# Patient Record
Sex: MALE | Race: WHITE | NOT HISPANIC OR LATINO | Employment: OTHER | URBAN - METROPOLITAN AREA
[De-identification: names, ages, dates, MRNs, and addresses within clinical notes are randomized per-mention and may not be internally consistent; named-entity substitution may affect disease eponyms.]

---

## 2021-05-30 ENCOUNTER — RECORDS - HEALTHEAST (OUTPATIENT)
Dept: ADMINISTRATIVE | Facility: CLINIC | Age: 71
End: 2021-05-30

## 2023-06-20 NOTE — H&P (VIEW-ONLY)
[facility]      Preoperative Consultation   Donald F Schoenberger   : 1950   Gender: male    Date of Encounter: 2023    Nursing Notes:   Sofie Barnes  2023 11:06 AM  Addendum  Donald F Schoenberger is a 72 y.o. male (1950) who presents for preop evaluation undergoing peritoneal dialysis catheter placement (23) & L4-5 bilateral medial facetectomy decompression for treatment of L4-5 spinal stenosis (23) .    Date of Surgery: 23 & 23  Surgical Specialty: Nephrology  Pedrito Hernández MD (23)  Orthopedic/Spine  Gabe Bahena MD - [facility], [facility] (23)      Hospital/Surgical Facility: [facility] (23)   [facility] (23)    Fax number: [phone] ([facility])  Surgery type: outpatient  Primary Physician: Dominique Dan MA................... 2023 10:52 AM             History of Present Illness   Patient who is 72 y.o. male with hypertension, renal artery stenosis, prostate cancer, chronic kidney disease stage V is undergoing peritoneal dialysis catheter placement on 2023 for chronic kidney disease stage V and L4-5 bilateral medial facetectomy decompression for treatment of L4-L5 spinal stenosis on 2023.     Patient saw nephrology last week who recommended he proceed with embedded catheter prior to back surgery.    Personal or family history of anesthesia complications: No  Personal or family history of bleeding or clotting disorder: No    Aspirin daily: No  NSAIDs: No    Denies any fever, chills, body aches, nasal congestion, chest pain, shortness of breath, heart palpitations, new leg swelling, painful urination, skin rashes, or open sores.     Plays pickle ball several times a week without any difficulties.       Review of Systems   A comprehensive review of systems was negative except for items noted in HPI.    Patient Active Problem List   Diagnosis Code      HTN (hypertension) I10     Hyperlipidemia E78.5     GISEL (renal artery stenosis) (HC) I70.1     Chronic renal failure, stage 5 (HC) N18.5     Renal atrophy N26.1     Prostate cancer (HC) C61     Current Outpatient Medications   Medication Sig     calcitriol (ROCALTROL) 0.25 mcg capsule Take one 3 days a week (begun due to inc. PTH  )     chlorthalidone (HYGROTON) 25 mg tablet      cholecalciferol (VITAMIN D-3) 2,000 unit capsule Take 1 capsule by mouth once daily.     fexofenadine (ALLEGRA) 180 mg tablet Take 180 mg by mouth.     gabapentin (NEURONTIN) 100 mg capsule      losartan (COZAAR) 25 mg tablet Take 1 tablet by mouth once daily. To preserve kidney function and for hypertension     medication order composer Take 4 Tablets by mouth once daily. Sodium Bicarbonate 650mg  Takes to per day,  For high potassium     metoprolol succinate (TOPROL XL) 100 mg Sustained-Release tablet Take 1 tablet by mouth once daily. For hypertension (begun 4-13)     No current facility-administered medications for this visit.     Medications have been reviewed by me and are current to the best of my knowledge and ability.     Allergies   Allergen Reactions     Aspirin Bleeding     Nosebleeds.     Lipitor [Atorvastatin] Myalgia     Viagra [Sildenafil] Other - Describe In Comment Field     Flushing blurred vision and coryza     Past Surgical History:   . Laterality Date     FINGER SURGERY  2007    Rt 5th     KNEE ARTHROSCOPY  1990    Rt     PROSTATECTOMY  2010     SKIN SURGERY  2013    [facility]     TONSILLECTOMY       Social History     Tobacco Use     Smoking status: Never     Smokeless tobacco: Never   Vaping Use     Vaping Use: Never used   Substance Use Topics     Alcohol use: No     Drug use: No     Family History   Problem Relation Age of Onset     Heart Disease Father         cad       PAST DIFFICULTY WITH ANESTHESIA: None     Physical Exam   /70 (Cuff Site: Right Arm, Position: Sitting, Cuff Size: Adult Large)    Pulse 76   Ht 1.829 m (6')   Wt 102.8 kg (226 lb 11.2 oz)   SpO2 97%   BMI 30.75 kg/m   Body mass index is 30.75 kg/m .  General Appearance: Pleasant, alert, appropriate appearance for age. No acute distress  OroPharynx Exam: Dental hygiene adequate. Normal buccal mucosa. Normal pharynx.  Neck Exam: Supple, no masses or nodes.  Chest/Respiratory Exam: Normal chest wall and respirations. Clear to auscultation.  Cardiovascular Exam: Regular rate and rhythm. S1, S2, no murmur, click, gallop, or rubs.  Lower extremities: No lower extremity edema noted  Skin: no open wounds or rashes.        Assessment / Plan     The Pre-Op Tool    Recommendations      Intermediate Risk Procedure    Risk of CV Complication (RCRI)  1%    Current Cardiac Status  Good exertional capacity ( > 4 mets )    Cardiac History  No history of coronary artery disease           Labs  HGB within last 30 days  Potassium within last 30 days  Creatinine within last 30 days  EKG  Baseline EKG within the last 12 months  CXR  Not indicated    Stress Testing  Not indicated    * Testing recommendations are intended to assist, but not direct, clinical decisions.           Type & Screen should be obtained by Anesthesia only if the risk of transfusion is > 5% for the procedure         Hold Losartan (Cozaar) the evening before and/or morning of the procedure.  Continue taking Metoprolol (Lopressor, Toprol); be sure to take the evening before and/or morning of the procedure.  Hold Chlorthalidone on the morning of procedure.  Take your other medications as usual prior to the procedure  Hold vitamins and/or supplements for 1 week prior to the procedure  Okay to take Acetaminophen (Tylenol) up until the procedure  Hold / avoid NSAIDs (e.g. ibuprofen, naproxen) prior to procedure: 2 days for ibuprofen (Advil) and 4 days for naproxen (Aleve).    * Medication recommendations are not intended to be exhaustive; they are limited to common medications that are  potentially dangerous if incorrectly managed          Labs  * Data supports elimination of  routine  laboratory testing in favor of focused,  indicated  testing based on medical co-morbidities. A 2009 study randomized 1061 patients undergoing ambulatory, non-cataract surgery to routine or to indicated testing. Perioperative adverse events were similar (Anesthesia & Analgesia 2009;108:467-75; Anesthesiol. Clin. 2016 Mar;34(1):43-58).  Stress Testing  * The current ACC/AHA guideline states that 'non-invasive stress testing is not useful for patients [with no clinical risk factors] undergoing noncardiac surgery' (JACC. 2014;64(21);e1-76.).  RAAS Antagonist  * Hypotension during anesthesia is associated with continuing renin-angiotensin system (RAAS) antagonist. While it is unclear if holding RAAS antagonists reduces postoperative complications, in most circumstances our experts recommend holding RAAS antagonist 24 hours prior to surgery. (Postgrad Med J 2011;87:472-81; Anest 2017;126:16-27; BMC Anesthesiol 2018;18:26.)     Session ID: [id number]  Endnotes and bibliography available upon request: [email or web address]    Labs: pending  ECG: yes normal sinus rhythm with first-degree block.  EKG change compared to previous EKG in 2010.      1. Preoperative examination  Patient is medically optimized for procedures pending labs.  Would consider nephrology consult if needed during back surgery.  - CREATININE; Future  - POTASSIUM; Future  - HEMOGLOBIN; Future  - EKG 12 LEAD; Future  - LA ECG ROUTINE ECG W/LEAST 12 LDS W/I&R  - CREATININE  - POTASSIUM  - HEMOGLOBIN    2. Chronic renal failure, stage 5 (HC)  See above     3. Spinal stenosis at L4-L5 level  See above     SILAS Roman ....................  6/20/2023   2:14 PM

## 2023-07-06 RX ORDER — LOSARTAN POTASSIUM 25 MG/1
25 TABLET ORAL DAILY
COMMUNITY

## 2023-07-06 RX ORDER — METOPROLOL SUCCINATE 100 MG/1
100 TABLET, EXTENDED RELEASE ORAL DAILY
COMMUNITY

## 2023-07-06 RX ORDER — FEXOFENADINE HCL 180 MG/1
180 TABLET ORAL PRN
COMMUNITY

## 2023-07-06 RX ORDER — SODIUM BICARBONATE 650 MG/1
1300 TABLET ORAL 2 TIMES DAILY
COMMUNITY

## 2023-07-06 RX ORDER — CHLORTHALIDONE 25 MG/1
25 TABLET ORAL DAILY
COMMUNITY
Start: 2023-02-27

## 2023-07-06 RX ORDER — GABAPENTIN 100 MG/1
100 CAPSULE ORAL AT BEDTIME
COMMUNITY
Start: 2023-05-03

## 2023-07-10 ENCOUNTER — ANESTHESIA EVENT (OUTPATIENT)
Dept: SURGERY | Facility: CLINIC | Age: 73
End: 2023-07-10
Payer: COMMERCIAL

## 2023-07-11 ENCOUNTER — HOSPITAL ENCOUNTER (OUTPATIENT)
Facility: CLINIC | Age: 73
Discharge: HOME | End: 2023-07-12
Attending: ORTHOPAEDIC SURGERY | Admitting: ORTHOPAEDIC SURGERY
Payer: COMMERCIAL

## 2023-07-11 ENCOUNTER — APPOINTMENT (OUTPATIENT)
Dept: PHYSICAL THERAPY | Facility: CLINIC | Age: 73
End: 2023-07-11
Attending: ORTHOPAEDIC SURGERY
Payer: COMMERCIAL

## 2023-07-11 ENCOUNTER — APPOINTMENT (OUTPATIENT)
Dept: RADIOLOGY | Facility: CLINIC | Age: 73
End: 2023-07-11
Attending: ORTHOPAEDIC SURGERY
Payer: COMMERCIAL

## 2023-07-11 ENCOUNTER — ANESTHESIA (OUTPATIENT)
Dept: SURGERY | Facility: CLINIC | Age: 73
End: 2023-07-11
Payer: COMMERCIAL

## 2023-07-11 DIAGNOSIS — M48.062 SPINAL STENOSIS OF LUMBAR REGION WITH NEUROGENIC CLAUDICATION: Primary | ICD-10-CM

## 2023-07-11 PROBLEM — M48.061 LUMBAR SPINAL STENOSIS: Status: ACTIVE | Noted: 2023-07-11

## 2023-07-11 PROCEDURE — 250N000005 HC OR RX SURGIFLO HEMOSTATIC MATRIX 10ML 199102S OPNP: Performed by: ORTHOPAEDIC SURGERY

## 2023-07-11 PROCEDURE — 250N000013 HC RX MED GY IP 250 OP 250 PS 637: Performed by: HOSPITALIST

## 2023-07-11 PROCEDURE — 272N000001 HC OR GENERAL SUPPLY STERILE: Performed by: ORTHOPAEDIC SURGERY

## 2023-07-11 PROCEDURE — 250N000013 HC RX MED GY IP 250 OP 250 PS 637: Performed by: ANESTHESIOLOGY

## 2023-07-11 PROCEDURE — 999N000141 HC STATISTIC PRE-PROCEDURE NURSING ASSESSMENT: Performed by: ORTHOPAEDIC SURGERY

## 2023-07-11 PROCEDURE — 250N000013 HC RX MED GY IP 250 OP 250 PS 637: Performed by: PHYSICIAN ASSISTANT

## 2023-07-11 PROCEDURE — 360N000084 HC SURGERY LEVEL 4 W/ FLUORO, PER MIN: Performed by: ORTHOPAEDIC SURGERY

## 2023-07-11 PROCEDURE — 250N000009 HC RX 250: Performed by: ORTHOPAEDIC SURGERY

## 2023-07-11 PROCEDURE — 250N000011 HC RX IP 250 OP 636: Mod: JZ | Performed by: ANESTHESIOLOGY

## 2023-07-11 PROCEDURE — 258N000003 HC RX IP 258 OP 636: Performed by: ANESTHESIOLOGY

## 2023-07-11 PROCEDURE — 710N000010 HC RECOVERY PHASE 1, LEVEL 2, PER MIN: Performed by: ORTHOPAEDIC SURGERY

## 2023-07-11 PROCEDURE — 250N000011 HC RX IP 250 OP 636: Performed by: PHYSICIAN ASSISTANT

## 2023-07-11 PROCEDURE — 370N000017 HC ANESTHESIA TECHNICAL FEE, PER MIN: Performed by: ORTHOPAEDIC SURGERY

## 2023-07-11 PROCEDURE — 250N000009 HC RX 250: Performed by: NURSE ANESTHETIST, CERTIFIED REGISTERED

## 2023-07-11 PROCEDURE — 250N000011 HC RX IP 250 OP 636: Performed by: NURSE ANESTHETIST, CERTIFIED REGISTERED

## 2023-07-11 PROCEDURE — 99204 OFFICE O/P NEW MOD 45 MIN: CPT | Performed by: HOSPITALIST

## 2023-07-11 PROCEDURE — 999N000182 XR SURGERY CARM FLUORO GREATER THAN 5 MIN: Mod: TC

## 2023-07-11 PROCEDURE — 250N000009 HC RX 250: Performed by: PHYSICIAN ASSISTANT

## 2023-07-11 PROCEDURE — 97162 PT EVAL MOD COMPLEX 30 MIN: CPT | Mod: GP

## 2023-07-11 PROCEDURE — 97116 GAIT TRAINING THERAPY: CPT | Mod: GP

## 2023-07-11 RX ORDER — NALOXONE HYDROCHLORIDE 0.4 MG/ML
0.4 INJECTION, SOLUTION INTRAMUSCULAR; INTRAVENOUS; SUBCUTANEOUS
Status: DISCONTINUED | OUTPATIENT
Start: 2023-07-11 | End: 2023-07-12 | Stop reason: HOSPADM

## 2023-07-11 RX ORDER — HYDROMORPHONE HCL IN WATER/PF 6 MG/30 ML
0.4 PATIENT CONTROLLED ANALGESIA SYRINGE INTRAVENOUS EVERY 5 MIN PRN
Status: DISCONTINUED | OUTPATIENT
Start: 2023-07-11 | End: 2023-07-11 | Stop reason: HOSPADM

## 2023-07-11 RX ORDER — CEFAZOLIN SODIUM/WATER 2 G/20 ML
2 SYRINGE (ML) INTRAVENOUS SEE ADMIN INSTRUCTIONS
Status: DISCONTINUED | OUTPATIENT
Start: 2023-07-11 | End: 2023-07-11 | Stop reason: HOSPADM

## 2023-07-11 RX ORDER — PROPOFOL 10 MG/ML
INJECTION, EMULSION INTRAVENOUS PRN
Status: DISCONTINUED | OUTPATIENT
Start: 2023-07-11 | End: 2023-07-11

## 2023-07-11 RX ORDER — EPHEDRINE SULFATE 50 MG/ML
INJECTION, SOLUTION INTRAMUSCULAR; INTRAVENOUS; SUBCUTANEOUS PRN
Status: DISCONTINUED | OUTPATIENT
Start: 2023-07-11 | End: 2023-07-11

## 2023-07-11 RX ORDER — METOPROLOL SUCCINATE 100 MG/1
100 TABLET, EXTENDED RELEASE ORAL DAILY
Status: DISCONTINUED | OUTPATIENT
Start: 2023-07-12 | End: 2023-07-12 | Stop reason: HOSPADM

## 2023-07-11 RX ORDER — ACETAMINOPHEN 325 MG/1
650 TABLET ORAL
Status: CANCELLED | OUTPATIENT
Start: 2023-07-11

## 2023-07-11 RX ORDER — LIDOCAINE 40 MG/G
CREAM TOPICAL
Status: DISCONTINUED | OUTPATIENT
Start: 2023-07-11 | End: 2023-07-12 | Stop reason: HOSPADM

## 2023-07-11 RX ORDER — CALCIUM CARBONATE 500 MG/1
2 TABLET, CHEWABLE ORAL 2 TIMES DAILY
COMMUNITY

## 2023-07-11 RX ORDER — ACETAMINOPHEN 325 MG/1
650 TABLET ORAL EVERY 4 HOURS PRN
Status: DISCONTINUED | OUTPATIENT
Start: 2023-07-14 | End: 2023-07-12 | Stop reason: HOSPADM

## 2023-07-11 RX ORDER — CALCITRIOL 0.25 UG/1
0.25 CAPSULE, LIQUID FILLED ORAL
Status: DISCONTINUED | OUTPATIENT
Start: 2023-07-13 | End: 2023-07-12 | Stop reason: HOSPADM

## 2023-07-11 RX ORDER — PROPOFOL 10 MG/ML
INJECTION, EMULSION INTRAVENOUS CONTINUOUS PRN
Status: DISCONTINUED | OUTPATIENT
Start: 2023-07-11 | End: 2023-07-11

## 2023-07-11 RX ORDER — BUPIVACAINE HYDROCHLORIDE AND EPINEPHRINE 2.5; 5 MG/ML; UG/ML
INJECTION, SOLUTION EPIDURAL; INFILTRATION; INTRACAUDAL; PERINEURAL
Status: DISCONTINUED
Start: 2023-07-11 | End: 2023-07-11 | Stop reason: HOSPADM

## 2023-07-11 RX ORDER — PROCHLORPERAZINE MALEATE 5 MG
5 TABLET ORAL EVERY 6 HOURS PRN
Status: DISCONTINUED | OUTPATIENT
Start: 2023-07-11 | End: 2023-07-12 | Stop reason: HOSPADM

## 2023-07-11 RX ORDER — AMOXICILLIN 250 MG
1-2 CAPSULE ORAL 2 TIMES DAILY PRN
Qty: 30 TABLET | Refills: 0 | Status: SHIPPED | OUTPATIENT
Start: 2023-07-11

## 2023-07-11 RX ORDER — ONDANSETRON 2 MG/ML
4 INJECTION INTRAMUSCULAR; INTRAVENOUS EVERY 30 MIN PRN
Status: DISCONTINUED | OUTPATIENT
Start: 2023-07-11 | End: 2023-07-11 | Stop reason: HOSPADM

## 2023-07-11 RX ORDER — OXYCODONE HYDROCHLORIDE 5 MG/1
5 TABLET ORAL
Status: CANCELLED | OUTPATIENT
Start: 2023-07-11

## 2023-07-11 RX ORDER — ACETAMINOPHEN 325 MG/1
650 TABLET ORAL EVERY 4 HOURS PRN
Qty: 50 TABLET | Refills: 0 | Status: SHIPPED | OUTPATIENT
Start: 2023-07-11

## 2023-07-11 RX ORDER — SODIUM CHLORIDE, SODIUM LACTATE, POTASSIUM CHLORIDE, CALCIUM CHLORIDE 600; 310; 30; 20 MG/100ML; MG/100ML; MG/100ML; MG/100ML
INJECTION, SOLUTION INTRAVENOUS CONTINUOUS
Status: DISCONTINUED | OUTPATIENT
Start: 2023-07-11 | End: 2023-07-11 | Stop reason: HOSPADM

## 2023-07-11 RX ORDER — CALCIUM CARBONATE 500 MG/1
1000 TABLET, CHEWABLE ORAL 2 TIMES DAILY WITH MEALS
Status: DISCONTINUED | OUTPATIENT
Start: 2023-07-11 | End: 2023-07-12 | Stop reason: HOSPADM

## 2023-07-11 RX ORDER — HYDRALAZINE HYDROCHLORIDE 20 MG/ML
5 INJECTION INTRAMUSCULAR; INTRAVENOUS EVERY 6 HOURS PRN
Status: DISCONTINUED | OUTPATIENT
Start: 2023-07-11 | End: 2023-07-12 | Stop reason: HOSPADM

## 2023-07-11 RX ORDER — OXYCODONE HYDROCHLORIDE 5 MG/1
10 TABLET ORAL EVERY 4 HOURS PRN
Status: DISCONTINUED | OUTPATIENT
Start: 2023-07-11 | End: 2023-07-12 | Stop reason: HOSPADM

## 2023-07-11 RX ORDER — METHOCARBAMOL 500 MG/1
500 TABLET, FILM COATED ORAL EVERY 6 HOURS PRN
Status: DISCONTINUED | OUTPATIENT
Start: 2023-07-11 | End: 2023-07-12 | Stop reason: HOSPADM

## 2023-07-11 RX ORDER — SODIUM BICARBONATE 650 MG/1
1300 TABLET ORAL 2 TIMES DAILY
Status: DISCONTINUED | OUTPATIENT
Start: 2023-07-11 | End: 2023-07-12 | Stop reason: HOSPADM

## 2023-07-11 RX ORDER — LIDOCAINE 40 MG/G
CREAM TOPICAL
Status: DISCONTINUED | OUTPATIENT
Start: 2023-07-11 | End: 2023-07-11 | Stop reason: HOSPADM

## 2023-07-11 RX ORDER — OXYCODONE HYDROCHLORIDE 5 MG/1
5 TABLET ORAL EVERY 4 HOURS PRN
Status: DISCONTINUED | OUTPATIENT
Start: 2023-07-11 | End: 2023-07-12 | Stop reason: HOSPADM

## 2023-07-11 RX ORDER — ACETAMINOPHEN 325 MG/1
975 TABLET ORAL EVERY 8 HOURS
Status: DISCONTINUED | OUTPATIENT
Start: 2023-07-11 | End: 2023-07-12 | Stop reason: HOSPADM

## 2023-07-11 RX ORDER — AMOXICILLIN 250 MG
1 CAPSULE ORAL 2 TIMES DAILY
Status: DISCONTINUED | OUTPATIENT
Start: 2023-07-11 | End: 2023-07-12 | Stop reason: HOSPADM

## 2023-07-11 RX ORDER — DIPHENHYDRAMINE HCL 12.5 MG/5ML
12.5 SOLUTION ORAL EVERY 6 HOURS PRN
Status: DISCONTINUED | OUTPATIENT
Start: 2023-07-11 | End: 2023-07-12 | Stop reason: HOSPADM

## 2023-07-11 RX ORDER — HYDROMORPHONE HCL IN WATER/PF 6 MG/30 ML
0.4 PATIENT CONTROLLED ANALGESIA SYRINGE INTRAVENOUS
Status: DISCONTINUED | OUTPATIENT
Start: 2023-07-11 | End: 2023-07-12 | Stop reason: HOSPADM

## 2023-07-11 RX ORDER — CALCITRIOL 0.25 UG/1
0.5 CAPSULE, LIQUID FILLED ORAL
COMMUNITY

## 2023-07-11 RX ORDER — ONDANSETRON 2 MG/ML
4 INJECTION INTRAMUSCULAR; INTRAVENOUS EVERY 6 HOURS PRN
Status: DISCONTINUED | OUTPATIENT
Start: 2023-07-11 | End: 2023-07-12 | Stop reason: HOSPADM

## 2023-07-11 RX ORDER — KETAMINE HYDROCHLORIDE 10 MG/ML
INJECTION INTRAMUSCULAR; INTRAVENOUS PRN
Status: DISCONTINUED | OUTPATIENT
Start: 2023-07-11 | End: 2023-07-11

## 2023-07-11 RX ORDER — HYDROMORPHONE HCL IN WATER/PF 6 MG/30 ML
0.2 PATIENT CONTROLLED ANALGESIA SYRINGE INTRAVENOUS
Status: DISCONTINUED | OUTPATIENT
Start: 2023-07-11 | End: 2023-07-12 | Stop reason: HOSPADM

## 2023-07-11 RX ORDER — GLYCOPYRROLATE 0.2 MG/ML
INJECTION, SOLUTION INTRAMUSCULAR; INTRAVENOUS PRN
Status: DISCONTINUED | OUTPATIENT
Start: 2023-07-11 | End: 2023-07-11

## 2023-07-11 RX ORDER — FEXOFENADINE HCL 180 MG/1
180 TABLET ORAL DAILY PRN
Status: DISCONTINUED | OUTPATIENT
Start: 2023-07-11 | End: 2023-07-12 | Stop reason: HOSPADM

## 2023-07-11 RX ORDER — NALOXONE HYDROCHLORIDE 0.4 MG/ML
0.2 INJECTION, SOLUTION INTRAMUSCULAR; INTRAVENOUS; SUBCUTANEOUS
Status: DISCONTINUED | OUTPATIENT
Start: 2023-07-11 | End: 2023-07-12 | Stop reason: HOSPADM

## 2023-07-11 RX ORDER — ONDANSETRON 4 MG/1
4 TABLET, ORALLY DISINTEGRATING ORAL
Status: CANCELLED | OUTPATIENT
Start: 2023-07-11

## 2023-07-11 RX ORDER — CALCITRIOL 0.25 UG/1
0.5 CAPSULE, LIQUID FILLED ORAL
Status: DISCONTINUED | OUTPATIENT
Start: 2023-07-11 | End: 2023-07-12 | Stop reason: HOSPADM

## 2023-07-11 RX ORDER — FENTANYL CITRATE 50 UG/ML
25 INJECTION, SOLUTION INTRAMUSCULAR; INTRAVENOUS EVERY 5 MIN PRN
Status: DISCONTINUED | OUTPATIENT
Start: 2023-07-11 | End: 2023-07-11 | Stop reason: HOSPADM

## 2023-07-11 RX ORDER — LOSARTAN POTASSIUM 25 MG/1
25 TABLET ORAL DAILY
Status: DISCONTINUED | OUTPATIENT
Start: 2023-07-12 | End: 2023-07-12 | Stop reason: HOSPADM

## 2023-07-11 RX ORDER — METHOCARBAMOL 750 MG/1
750 TABLET, FILM COATED ORAL
Status: COMPLETED | OUTPATIENT
Start: 2023-07-11 | End: 2023-07-11

## 2023-07-11 RX ORDER — BISACODYL 10 MG
10 SUPPOSITORY, RECTAL RECTAL DAILY PRN
Status: DISCONTINUED | OUTPATIENT
Start: 2023-07-11 | End: 2023-07-12 | Stop reason: HOSPADM

## 2023-07-11 RX ORDER — BUPIVACAINE HYDROCHLORIDE AND EPINEPHRINE 2.5; 5 MG/ML; UG/ML
2 INJECTION, SOLUTION EPIDURAL; INFILTRATION; INTRACAUDAL; PERINEURAL ONCE
Status: COMPLETED | OUTPATIENT
Start: 2023-07-11 | End: 2023-07-11

## 2023-07-11 RX ORDER — VITAMIN B COMPLEX
1 TABLET ORAL EVERY EVENING
COMMUNITY

## 2023-07-11 RX ORDER — HYDROMORPHONE HCL IN WATER/PF 6 MG/30 ML
0.2 PATIENT CONTROLLED ANALGESIA SYRINGE INTRAVENOUS EVERY 5 MIN PRN
Status: DISCONTINUED | OUTPATIENT
Start: 2023-07-11 | End: 2023-07-11 | Stop reason: HOSPADM

## 2023-07-11 RX ORDER — OXYCODONE HYDROCHLORIDE 5 MG/1
5-10 TABLET ORAL EVERY 4 HOURS PRN
Qty: 20 TABLET | Refills: 0 | Status: SHIPPED | OUTPATIENT
Start: 2023-07-11

## 2023-07-11 RX ORDER — METHOCARBAMOL 750 MG/1
750 TABLET, FILM COATED ORAL
Status: CANCELLED | OUTPATIENT
Start: 2023-07-11

## 2023-07-11 RX ORDER — FENTANYL CITRATE 50 UG/ML
50 INJECTION, SOLUTION INTRAMUSCULAR; INTRAVENOUS EVERY 5 MIN PRN
Status: DISCONTINUED | OUTPATIENT
Start: 2023-07-11 | End: 2023-07-11 | Stop reason: HOSPADM

## 2023-07-11 RX ORDER — ACETAMINOPHEN 500 MG
1000 TABLET ORAL EVERY 8 HOURS PRN
Status: ON HOLD | COMMUNITY
End: 2023-07-12

## 2023-07-11 RX ORDER — CHLORTHALIDONE 25 MG/1
25 TABLET ORAL DAILY
Status: DISCONTINUED | OUTPATIENT
Start: 2023-07-12 | End: 2023-07-12 | Stop reason: HOSPADM

## 2023-07-11 RX ORDER — FENTANYL CITRATE 50 UG/ML
INJECTION, SOLUTION INTRAMUSCULAR; INTRAVENOUS PRN
Status: DISCONTINUED | OUTPATIENT
Start: 2023-07-11 | End: 2023-07-11

## 2023-07-11 RX ORDER — CEFAZOLIN SODIUM/WATER 2 G/20 ML
2 SYRINGE (ML) INTRAVENOUS
Status: COMPLETED | OUTPATIENT
Start: 2023-07-11 | End: 2023-07-11

## 2023-07-11 RX ORDER — SODIUM CHLORIDE 9 MG/ML
INJECTION, SOLUTION INTRAVENOUS CONTINUOUS
Status: DISCONTINUED | OUTPATIENT
Start: 2023-07-11 | End: 2023-07-11 | Stop reason: HOSPADM

## 2023-07-11 RX ORDER — VITAMIN B COMPLEX
25 TABLET ORAL EVERY EVENING
Status: DISCONTINUED | OUTPATIENT
Start: 2023-07-11 | End: 2023-07-12 | Stop reason: HOSPADM

## 2023-07-11 RX ORDER — POLYETHYLENE GLYCOL 3350 17 G/17G
17 POWDER, FOR SOLUTION ORAL DAILY
Status: DISCONTINUED | OUTPATIENT
Start: 2023-07-12 | End: 2023-07-12 | Stop reason: HOSPADM

## 2023-07-11 RX ORDER — LIDOCAINE HYDROCHLORIDE 10 MG/ML
INJECTION, SOLUTION EPIDURAL; INFILTRATION; INTRACAUDAL; PERINEURAL PRN
Status: DISCONTINUED | OUTPATIENT
Start: 2023-07-11 | End: 2023-07-11

## 2023-07-11 RX ORDER — ACETAMINOPHEN 325 MG/1
975 TABLET ORAL ONCE
Status: COMPLETED | OUTPATIENT
Start: 2023-07-11 | End: 2023-07-11

## 2023-07-11 RX ORDER — CALCITRIOL 0.25 UG/1
0.25 CAPSULE, LIQUID FILLED ORAL
COMMUNITY

## 2023-07-11 RX ORDER — ONDANSETRON 4 MG/1
4 TABLET, ORALLY DISINTEGRATING ORAL EVERY 30 MIN PRN
Status: DISCONTINUED | OUTPATIENT
Start: 2023-07-11 | End: 2023-07-11 | Stop reason: HOSPADM

## 2023-07-11 RX ORDER — ONDANSETRON 4 MG/1
4 TABLET, ORALLY DISINTEGRATING ORAL EVERY 6 HOURS PRN
Status: DISCONTINUED | OUTPATIENT
Start: 2023-07-11 | End: 2023-07-12 | Stop reason: HOSPADM

## 2023-07-11 RX ORDER — MAGNESIUM SULFATE 4 G/50ML
4 INJECTION INTRAVENOUS ONCE
Status: COMPLETED | OUTPATIENT
Start: 2023-07-11 | End: 2023-07-11

## 2023-07-11 RX ADMIN — ACETAMINOPHEN 975 MG: 325 TABLET ORAL at 21:50

## 2023-07-11 RX ADMIN — FENTANYL CITRATE 100 MCG: 50 INJECTION, SOLUTION INTRAMUSCULAR; INTRAVENOUS at 07:34

## 2023-07-11 RX ADMIN — OXYCODONE HYDROCHLORIDE 5 MG: 5 TABLET ORAL at 21:50

## 2023-07-11 RX ADMIN — Medication 10 MG: at 08:02

## 2023-07-11 RX ADMIN — Medication 2 G: at 07:26

## 2023-07-11 RX ADMIN — SUGAMMADEX 200 MG: 100 INJECTION, SOLUTION INTRAVENOUS at 08:56

## 2023-07-11 RX ADMIN — CALCITRIOL 0.5 MCG: 0.25 CAPSULE ORAL at 21:50

## 2023-07-11 RX ADMIN — PROPOFOL 150 MG: 10 INJECTION, EMULSION INTRAVENOUS at 07:34

## 2023-07-11 RX ADMIN — METHOCARBAMOL TABLETS 500 MG: 500 TABLET, COATED ORAL at 17:19

## 2023-07-11 RX ADMIN — CALCIUM CARBONATE (ANTACID) CHEW TAB 500 MG 1000 MG: 500 CHEW TAB at 17:19

## 2023-07-11 RX ADMIN — ACETAMINOPHEN 975 MG: 325 TABLET ORAL at 06:59

## 2023-07-11 RX ADMIN — Medication 25 MCG: at 21:49

## 2023-07-11 RX ADMIN — ROCURONIUM BROMIDE 20 MG: 10 INJECTION, SOLUTION INTRAVENOUS at 08:13

## 2023-07-11 RX ADMIN — SENNOSIDES AND DOCUSATE SODIUM 1 TABLET: 50; 8.6 TABLET ORAL at 21:49

## 2023-07-11 RX ADMIN — SODIUM CHLORIDE: 9 INJECTION, SOLUTION INTRAVENOUS at 07:01

## 2023-07-11 RX ADMIN — MAGNESIUM SULFATE HEPTAHYDRATE 4 G: 4 INJECTION, SOLUTION INTRAVENOUS at 07:11

## 2023-07-11 RX ADMIN — GLYCOPYRROLATE 0.2 MG: 0.2 INJECTION INTRAMUSCULAR; INTRAVENOUS at 07:26

## 2023-07-11 RX ADMIN — PROPOFOL 150 MCG/KG/MIN: 10 INJECTION, EMULSION INTRAVENOUS at 07:34

## 2023-07-11 RX ADMIN — LIDOCAINE HYDROCHLORIDE 50 MG: 10 INJECTION, SOLUTION EPIDURAL; INFILTRATION; INTRACAUDAL; PERINEURAL at 07:34

## 2023-07-11 RX ADMIN — METHOCARBAMOL 750 MG: 750 TABLET, FILM COATED ORAL at 07:11

## 2023-07-11 RX ADMIN — ACETAMINOPHEN 975 MG: 325 TABLET ORAL at 14:48

## 2023-07-11 RX ADMIN — Medication 10 MG: at 08:15

## 2023-07-11 RX ADMIN — MIDAZOLAM 2 MG: 1 INJECTION INTRAMUSCULAR; INTRAVENOUS at 07:26

## 2023-07-11 RX ADMIN — HYDROMORPHONE HYDROCHLORIDE 0.5 MG: 1 INJECTION, SOLUTION INTRAMUSCULAR; INTRAVENOUS; SUBCUTANEOUS at 08:26

## 2023-07-11 RX ADMIN — ROCURONIUM BROMIDE 50 MG: 10 INJECTION, SOLUTION INTRAVENOUS at 07:34

## 2023-07-11 RX ADMIN — KETAMINE HYDROCHLORIDE 20 MG: 10 INJECTION, SOLUTION INTRAMUSCULAR; INTRAVENOUS at 07:34

## 2023-07-11 ASSESSMENT — ACTIVITIES OF DAILY LIVING (ADL)
ADLS_ACUITY_SCORE: 36
ADLS_ACUITY_SCORE: 36
ADLS_ACUITY_SCORE: 35
ADLS_ACUITY_SCORE: 36
ADLS_ACUITY_SCORE: 36

## 2023-07-11 NOTE — CONSULTS
MEDICINE CONSULT    Physician requesting consult: Dr. Bahena  Reason for consult: Medical management     Assessment and Plan:    Donald F Schoenberger is a 72 year old old male who  has a past medical history of Hypertension and Renal disease.     7/11/2023 - Admitted for elective lumbar facetectomy and decompression with Dr. Bahena. HMS is consult    Essential HTN  Renal artery stenosis  No headache or lightheadedness  -resume chlorthalidone, metoprolol tomorrow    CKD 5  Renal atrophy left  Baseline Cr 4.5  Healing PD cath site left abd  -resume calcitriol, Nabicarb  -losartan tomorrow  -check am bmp    Prostate Cancer s/p prostatectomy  Post-op void monitoring per primary  Ca undetectable for the past 13 years    Chronic anemia  Baseline 10.3    1st degree AV block  RRR on exam, no murmur    Status-post Procedure(s):  LUMBAR 4-5 BILATERAL MEDIAL FACETECTOMY AND DECOMPRESSIONperformed on 07/11/23    EBL: 5cc  No results found for: HGB, CR    PPx: defer to surgery, will comment if medicine input requested     History:    Donald F Schoenberger is a 72 year old old male who is feeling good post-op. He's taking PO, voiding. Not yet passing gas. No acute complaints other than dry throat/mouth.    Denies history of MI, CVA, VTE, DM   Former law enforcement    Denies heavy alcohol use  Denies tobacco use    Surgical History  He  has no past surgical history on file.   History reviewed. No pertinent surgical history.    Allergies  Allergies   Allergen Reactions     Aspirin Other (See Comments)     Nosebleeds.     Atorvastatin Muscle Pain (Myalgia)     Sildenafil Other (See Comments)     Flushing blurred vision and coryza       Social History  Reviewed, and he  reports that he has never smoked. He has never used smokeless tobacco. He reports that he does not currently use alcohol. He reports that he does not use drugs.  Social History     Tobacco Use     Smoking status: Never     Smokeless tobacco: Never   Substance Use  Topics     Alcohol use: Not Currently       Family History  Reviewed, and family history is not on file.    Review of Systems  All pertinent positives and negatives reviewed in History.  All other 12 point review of systems reviewed and negative.      Objective:    Physical Exam  Constitutional: Appears nad in the bed, Body mass index is 29.69 kg/m .   Oral mucosa dry, pupils symmetric  abd binder in place  Moving all 4 extremities  Psych: Normal mood and affect, alert and oriented ×3    Cardiographics  ECG: personally reviewed     Imaging  XR Surgery TATI  Fluoro G/T 5 Min  This exam was marked as non-reportable because it will not be read by a   radiologist or a [place] non-radiologist provider.     Lab Review   No visits with results within 2 Month(s) from this visit.   Latest known visit with results is:   No results found for any previous visit.       Appreciate the opportunity to participate in the care of Donald F Schoenberger, please feel free to contact for any questions or concerns     Ángel Funez MD, MPH  Hospitalist  [facility]  Office # [phone]

## 2023-07-11 NOTE — PROCEDURES
Procedure     PREOPERATIVE DIAGNOSES:  1.     L4-5 spinal stenosis with neurogenic claudication.  2.     Failure of conservative management.      POSTOPERATIVE DIAGNOSES:  1.     L4-5 spinal stenosis with neurogenic claudication.  2.     Failure of conservative management.      PROCEDURES PERFORMED:  1.     Right L4-5 laminotomy.  2.     Bilateral L4-5 medial facetectomies and bilateral lateral recess decompression.      ATTENDING SURGEON:  Gabe Bahena M.D.      FIRST ASSISTANT:  Baltazar Oneill PA-C assist required for the entire duration of the case, including patient positioning, soft tissue retraction, and patient safety. He was invaluable in the performance of the discectomy, particularly in protecting the edge of the dura and the nerve root. I would not have allowed this job to be done by a surgical tech, but by a trained surgical PA with training in spine.         ESTIMATED BLOOD LOSS:5 cc.     COMPLICATIONS:  None.     DETAILS OF PROCEDURE/INTRODUCTION:  The patient is a very pleasant 72 year old male patient who came to the clinic with a longstanding history of low back pain and bilateral lower extremity pain consistent with imaging studies showing severe L4-5 spinal stenosis. The patient had exhausted nonoperative measures and continued to have debilitating symptoms. Therefore, I had a discussion with him regarding surgery and specifically discussed the risks including but not limited to death, infection, dural tear, nerve injury, and nonresolution of symptoms, among others, and he accepted and wished to proceed.      The patient was brought to the operating room and placed under general endotracheal anesthetic without complications. Antibiotics were given intravenously within 1 hour of incision. He was then placed prone on the Daquan table ensuring that all nerve areas and bony areas were padded and free of pressure. The low back area was then prepped and draped in routine sterile manner. After  appropriate timeout, I placed a marking needle into the skin and subcutaneous tissue and took intraoperative imaging to plan my incision. Thereafter, I made an incision over the L4 and L5  spinous processes and carried out a subperiosteal dissection on the right side to expose the interlaminar space. I took intraoperative imaging to confirm my level. I then,  on the L4-5 level on the right side, used a andrews to andrews down the medial descending facet of L4. I used a Kerrison rongeur to perform a laminotomy and medial facetectomy all the way to the medial border of the L4 and L5 pedicles on the right side. I then, through the same laminotomy, performed a contralateral lateral recess decompression and medial facetectomy all the way to the medial border of the L4 and L5 pedicles on the left side.  I then performed copious irrigation and hemostasis. At the end of the procedure, there was a capacious canal at the L4-5 level with no evidence of neural impingement. I performed closure of the fascia utilizing Vicryl 0 in a continuous fashion over a drain. I then closed the subcutaneous tissue layer utilizing Vicryl 2-0. Vicryl 3-0 was used for the skin. Marcaine 0.25% with epinephrine was then injected into the skin and subcutaneous tissues. Steri-Strips were applied followed by a sterile dressing. The patient was then placed supine on his bed and subsequently extubated without complications and brought to the recovery room in satisfactory condition.      POSTOPERATIVE PLAN:  The patient is to mobilize as tolerated. Oxycodone for pain. After discharge, the patient will be seen back in clinic in 6 weeks.

## 2023-07-11 NOTE — ANESTHESIA CARE TRANSFER NOTE
Patient: Donald F Schoenberger    Procedure: Procedure(s):  LUMBAR 4-5 BILATERAL MEDIAL FACETECTOMY AND DECOMPRESSION       Diagnosis: Spinal stenosis [M48.00]  Diagnosis Additional Information: No value filed.    Anesthesia Type:   General     Note:    Oropharynx: oropharynx clear of all foreign objects and spontaneously breathing  Level of Consciousness: drowsy  Oxygen Supplementation: face mask  Level of Supplemental Oxygen (L/min / FiO2): 8  Independent Airway: airway patency satisfactory and stable  Dentition: dentition unchanged  Vital Signs Stable: post-procedure vital signs reviewed and stable  Report to RN Given: handoff report given  Patient transferred to: PACU    Handoff Report: Identifed the Patient, Identified the Reponsible Provider, Reviewed the pertinent medical history, Discussed the surgical course, Reviewed Intra-OP anesthesia mangement and issues during anesthesia, Set expectations for post-procedure period and Allowed opportunity for questions and acknowledgement of understanding      Vitals:  Vitals Value Taken Time   /69 07/11/23 0907   Temp 36.9  C (98.5  F) 07/11/23 0905   Pulse 62 07/11/23 0908   Resp 24 07/11/23 0908   SpO2 100 % 07/11/23 0908   Vitals shown include unvalidated device data.    Electronically Signed By: SERGIO Rocha CRNA  July 11, 2023  9:10 AM

## 2023-07-11 NOTE — PROVIDER NOTIFICATION
Notified Dr. Jones of pt not having labs this AM (pt has CKD4), per MDA, ok with H&P labs unless surgeon wants them. IV fluids changed to Normal saline @ rate of 100ml/hr    Dr Bahena also notified and no new lab orders placed.

## 2023-07-11 NOTE — PROGRESS NOTES
[facility] Orthopedic Brief Operative Note    Pre-operative diagnosis: Spinal stenosis [M48.00]   Post-operative diagnosis: Same   Procedure: Bilateral L4-5 medial facetectomy and decompression   Surgeon: Gabe Bahena   Assistant(s): Baltazar Oneill PA-C   Anesthesia: General endotracheal anesthesia   Estimated blood loss: Less than 20 ml   Total IV fluids: (See anesthesia record)   Blood transfusion: No transfusion was given during surgery   Total urine output: (See anesthesia record)   Drains: Hemovac   Specimens: None   Implants: None   Findings: L4-5 spinal stenosis   Complications: None   Condition: Stable   Weight bearing status: Weight bearing as tolerated   Activity: Activity as tolerated  Patient may move about with assist as indicated or with supervision   Orthotic management: Not applicable   Comments: See dictated operative report for full details     Patient admitted overnight for observation. Will anticipate going home tomorrow. HV drain may be removed tomorrow if HV drain <30 ml for 2 consecutive shifts. If HV output greater than 30 ml after 2 readings, update surgical team to see if safe to pull prior to discharge.     Shahzad Oneill PA-C  [facility]

## 2023-07-11 NOTE — ANESTHESIA POSTPROCEDURE EVALUATION
Patient: Donald F Schoenberger    Procedure: Procedure(s):  LUMBAR 4-5 BILATERAL MEDIAL FACETECTOMY AND DECOMPRESSION       Anesthesia Type:  General    Note:  Disposition: Admission   Postop Pain Control: Uneventful            Sign Out: Well controlled pain   PONV: No   Neuro/Psych: Uneventful            Sign Out: Acceptable/Baseline neuro status   Airway/Respiratory: Uneventful            Sign Out: Acceptable/Baseline resp. status   CV/Hemodynamics: Uneventful            Sign Out: Acceptable CV status; No obvious hypovolemia; No obvious fluid overload   Other NRE: NONE   DID A NON-ROUTINE EVENT OCCUR? No           Last vitals:  Vitals Value Taken Time   /69 07/11/23 1031   Temp 37  C (98.6  F) 07/11/23 0945   Pulse 61 07/11/23 1052   Resp 13 07/11/23 0948   SpO2 95 % 07/11/23 1052   Vitals shown include unvalidated device data.    Electronically Signed By: Sriram Jones MD  July 11, 2023  10:53 AM

## 2023-07-11 NOTE — PHARMACY-ADMISSION MEDICATION HISTORY
Pharmacist Admission Medication History    Admission medication history is complete. The information provided in this note is only as accurate as the sources available at the time of the update.    Medication reconciliation/reorder completed by provider prior to medication history? Yes    Information Source(s): Patient and CareEverywhere/SureScripts via in-person    Pertinent Information:     Medication Affordability:  Not including over the counter (OTC) medications, was there a time in the past 3 months when you did not take your medications as prescribed because of cost?: No    Allergies reviewed with patient and updates made in EHR: yes    Medication History Completed By: Whit Zimmerman, Juan LEYVA, BCPS,, BCCCP 7/11/2023 7:21 AM    Prior to Admission medications    Medication Sig Last Dose Taking? Auth Provider Long Term End Date   acetaminophen (TYLENOL) 500 MG tablet Take 1,000 mg by mouth every 8 hours as needed for mild pain 7/11/2023 at 0000 Yes Unknown, Entered By History     calcitRIOL (ROCALTROL) 0.25 MCG capsule Take 0.5 mcg by mouth three times a week Mon, Tues, Wed 7/10/2023 at pm Yes Unknown, Entered By History Yes    calcitRIOL (ROCALTROL) 0.25 MCG capsule Take 0.25 mcg by mouth four times a week Thurs, Fri, Sat, Sun 7/9/2023 at unknown Yes Unknown, Entered By History Yes    calcium carbonate (TUMS) 500 MG chewable tablet Take 2 chew tab by mouth 2 times daily With lunch and evening meal 7/10/2023 at pm Yes Unknown, Entered By History     chlorthalidone (HYGROTON) 25 MG tablet Take 25 mg by mouth daily 7/10/2023 at am Yes Reported, Patient Yes    fexofenadine (ALLEGRA) 180 MG tablet Take 180 mg by mouth as needed More than a month at unknown Yes Reported, Patient     gabapentin (NEURONTIN) 100 MG capsule Take 100 mg by mouth At Bedtime 7/10/2023 at pm Yes Reported, Patient Yes    losartan (COZAAR) 25 MG tablet Take 25 mg by mouth daily Every morning 7/11/2023 at 0500 Yes Reported, Patient Yes     metoprolol succinate ER (TOPROL XL) 100 MG 24 hr tablet Take 100 mg by mouth daily Every morning 7/11/2023 at 0500 Yes Reported, Patient Yes    sodium bicarbonate 650 MG tablet Take 1,300 mg by mouth 2 times daily 7/10/2023 at pm Yes Reported, Patient     sodium chloride (OCEAN) 0.65 % nasal spray Spray 1 spray into both nostrils daily as needed for congestion 7/11/2023 at 0000 Yes Unknown, Entered By History     Vitamin D3 (CHOLECALCIFEROL) 25 mcg (1000 units) tablet Take 1 tablet by mouth every evening 7/10/2023 at pm Yes Unknown, Entered By History

## 2023-07-11 NOTE — ANESTHESIA PREPROCEDURE EVALUATION
Anesthesia Pre-Procedure Evaluation    Patient: Donald F Schoenberger   MRN: [id number] : 1950        Procedure : Procedure(s):  LUMBAR 4-5 BILATERAL MEDIAL FACETECTOMY AND DECOMPRESSION          Past Medical History:   Diagnosis Date     Hypertension      Renal disease       History reviewed. No pertinent surgical history.   Allergies   Allergen Reactions     Aspirin Other (See Comments)     Nosebleeds.     Atorvastatin Muscle Pain (Myalgia)     Sildenafil Other (See Comments)     Flushing blurred vision and coryza      Social History     Tobacco Use     Smoking status: Never     Smokeless tobacco: Never   Substance Use Topics     Alcohol use: Not Currently      Wt Readings from Last 1 Encounters:   23 102.1 kg (225 lb)        Anesthesia Evaluation   Pt has had prior anesthetic.         ROS/MED HX  ENT/Pulmonary:  - neg pulmonary ROS     Neurologic:     (+) peripheral neuropathy,     Cardiovascular:     (+) hypertension-----    METS/Exercise Tolerance:     Hematologic:     (+) anemia,     Musculoskeletal:       GI/Hepatic:       Renal/Genitourinary:     (+) renal disease, type: CRI, Pt does not require dialysis,     Endo:     (+) Obesity,     Psychiatric/Substance Use:  - neg psychiatric ROS     Infectious Disease:  - neg infectious disease ROS     Malignancy:       Other:            Physical Exam    Airway  airway exam normal      Mallampati: II   TM distance: > 3 FB   Neck ROM: full   Mouth opening: > 3 cm    Respiratory Devices and Support         Dental       (+) Modest Abnormalities - crowns, retainers, 1 or 2 missing teeth      Cardiovascular   cardiovascular exam normal       Rhythm and rate: regular and normal     Pulmonary   pulmonary exam normal        breath sounds clear to auscultation           OUTSIDE LABS:  CBC: No results found for: WBC, HGB, HCT, PLT  BMP: No results found for: NA, POTASSIUM, CHLORIDE, CO2, BUN, CR, GLC  COAGS: No results found for: PTT, INR, FIBR  POC: No results  found for: BGM, HCG, HCGS  HEPATIC: No results found for: ALBUMIN, PROTTOTAL, ALT, AST, GGT, ALKPHOS, BILITOTAL, BILIDIRECT, TK  OTHER: No results found for: PH, LACT, A1C, LAKSHMI, PHOS, MAG, LIPASE, AMYLASE, TSH, T4, T3, CRP, SED    Anesthesia Plan    ASA Status:  3   NPO Status:  NPO Appropriate    Anesthesia Type: General.     - Airway: ETT   Induction: Intravenous, Propofol.   Maintenance: Inhalation.   Techniques and Equipment:       - Drips/Meds: Dexmed. bolus, Ketamine     Consents    Anesthesia Plan(s) and associated risks, benefits, and realistic alternatives discussed. Questions answered and patient/representative(s) expressed understanding.     - Discussed: Risks, Benefits and Alternatives for BOTH SEDATION and the PROCEDURE were discussed     - Discussed with:  Patient      - Extended Intubation/Ventilatory Support Discussed: No.    Use of blood products discussed: Yes.     - Discussed with: Patient.     - Consented: consented to blood products            Reason for refusal: other.     Postoperative Care    Pain management: IV analgesics.   PONV prophylaxis: Ondansetron (or other 5HT-3), Dexamethasone or Solumedrol, Background Propofol Infusion     Comments:                Sriram Jones MD

## 2023-07-12 ENCOUNTER — APPOINTMENT (OUTPATIENT)
Dept: OCCUPATIONAL THERAPY | Facility: CLINIC | Age: 73
End: 2023-07-12
Attending: ORTHOPAEDIC SURGERY
Payer: COMMERCIAL

## 2023-07-12 ENCOUNTER — APPOINTMENT (OUTPATIENT)
Dept: PHYSICAL THERAPY | Facility: CLINIC | Age: 73
End: 2023-07-12
Payer: COMMERCIAL

## 2023-07-12 VITALS
DIASTOLIC BLOOD PRESSURE: 69 MMHG | BODY MASS INDEX: 29.82 KG/M2 | OXYGEN SATURATION: 97 % | RESPIRATION RATE: 18 BRPM | HEIGHT: 73 IN | TEMPERATURE: 98.2 F | HEART RATE: 66 BPM | WEIGHT: 225 LBS | SYSTOLIC BLOOD PRESSURE: 148 MMHG

## 2023-07-12 LAB
ANION GAP SERPL CALCULATED.3IONS-SCNC: 15 MMOL/L (ref 5–18)
BUN SERPL-MCNC: 61 MG/DL (ref 8–28)
CALCIUM SERPL-MCNC: 11.3 MG/DL (ref 8.5–10.5)
CHLORIDE BLD-SCNC: 101 MMOL/L (ref 98–107)
CO2 SERPL-SCNC: 22 MMOL/L (ref 22–31)
CREAT SERPL-MCNC: 5.27 MG/DL (ref 0.7–1.3)
GFR SERPL CREATININE-BSD FRML MDRD: 11 ML/MIN/1.73M2
GLUCOSE BLD-MCNC: 152 MG/DL (ref 70–125)
GLUCOSE BLDC GLUCOMTR-MCNC: 202 MG/DL (ref 70–99)
HGB BLD-MCNC: 10.2 G/DL (ref 13.3–17.7)
POTASSIUM BLD-SCNC: 5.3 MMOL/L (ref 3.5–5)
SODIUM SERPL-SCNC: 138 MMOL/L (ref 136–145)

## 2023-07-12 PROCEDURE — 85018 HEMOGLOBIN: CPT

## 2023-07-12 PROCEDURE — 250N000013 HC RX MED GY IP 250 OP 250 PS 637: Performed by: PHYSICIAN ASSISTANT

## 2023-07-12 PROCEDURE — 99214 OFFICE O/P EST MOD 30 MIN: CPT | Performed by: HOSPITALIST

## 2023-07-12 PROCEDURE — 36415 COLL VENOUS BLD VENIPUNCTURE: CPT

## 2023-07-12 PROCEDURE — 36415 COLL VENOUS BLD VENIPUNCTURE: CPT | Performed by: HOSPITALIST

## 2023-07-12 PROCEDURE — 82962 GLUCOSE BLOOD TEST: CPT

## 2023-07-12 PROCEDURE — 250N000013 HC RX MED GY IP 250 OP 250 PS 637: Performed by: HOSPITALIST

## 2023-07-12 PROCEDURE — 80048 BASIC METABOLIC PNL TOTAL CA: CPT | Performed by: HOSPITALIST

## 2023-07-12 PROCEDURE — 97166 OT EVAL MOD COMPLEX 45 MIN: CPT | Mod: GO

## 2023-07-12 PROCEDURE — 97535 SELF CARE MNGMENT TRAINING: CPT | Mod: GO

## 2023-07-12 PROCEDURE — 97116 GAIT TRAINING THERAPY: CPT | Mod: GP

## 2023-07-12 PROCEDURE — 97110 THERAPEUTIC EXERCISES: CPT | Mod: GP

## 2023-07-12 RX ORDER — ACETAMINOPHEN 325 MG/1
650 TABLET ORAL EVERY 4 HOURS PRN
Qty: 100 TABLET | Refills: 0 | Status: SHIPPED | OUTPATIENT
Start: 2023-07-12

## 2023-07-12 RX ADMIN — CHLORTHALIDONE 25 MG: 25 TABLET ORAL at 09:17

## 2023-07-12 RX ADMIN — METOPROLOL SUCCINATE 100 MG: 100 TABLET, EXTENDED RELEASE ORAL at 09:17

## 2023-07-12 RX ADMIN — CALCIUM CARBONATE (ANTACID) CHEW TAB 500 MG 1000 MG: 500 CHEW TAB at 12:42

## 2023-07-12 RX ADMIN — SENNOSIDES AND DOCUSATE SODIUM 1 TABLET: 50; 8.6 TABLET ORAL at 09:17

## 2023-07-12 RX ADMIN — SODIUM BICARBONATE 650 MG TABLET 1300 MG: at 09:17

## 2023-07-12 ASSESSMENT — ACTIVITIES OF DAILY LIVING (ADL)
ADLS_ACUITY_SCORE: 36
ADLS_ACUITY_SCORE: 32
PREVIOUS_RESPONSIBILITIES: MEAL PREP;HOUSEKEEPING;LAUNDRY;SHOPPING;MEDICATION MANAGEMENT;FINANCES;DRIVING
ADLS_ACUITY_SCORE: 32

## 2023-07-12 NOTE — PROGRESS NOTES
Patient vital signs are at baseline: Yes  Patient able to ambulate as they were prior to admission or with assist devices provided by therapies during their stay:  Yes  Patient MUST void prior to discharge:  Yes  Patient able to tolerate oral intake:  Yes  Pain has adequate pain control using Oral analgesics:  Yes PRN oxy   Does patient have an identified :  Yes  Has goal D/C date and time been discussed with patient:  Yes   Pt A/o x4, calls appropriately, alarms on for safety. Drain intact, to bulb suction.

## 2023-07-12 NOTE — PROGRESS NOTES
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 Bahena. [facility]    Assessment/Plan  Essential HTN  Renal artery stenosis  Hyperkalemia  BP is adequately controlled  -resume chlorthalidone, metoprolol  -hold losartan x1 dose for K+  -med rec completed     CKD 5  Renal atrophy left  Baseline Cr 4.5  Healing PD cath site left abd unchanged, non-tender  -calcitriol, [place]     Prostate Cancer s/p prostatectomy  Post-op void monitoring per primary  Ca undetectable for the past 13 years  Voiding okay this am, bladder scan < 70cc PVR     Chronic anemia  Baseline 10.3, now 10.2     1st degree AV block   RRR on exam, no murmur     LUMBAR 4-5 BILATERAL MEDIAL FACETECTOMY AND DECOMPRESSION  5cc EBL       Discharge: Today  Code Status: Full Code    Subjective:  No acute complaints, got poor sleep Exam:  Nad, appropriate, ashlee face  abd NT     Last 24H PRN:      methocarbamol (ROBAXIN) tablet 500 mg, 500 mg at 07/11/23 1719     oxyCODONE (ROXICODONE) tablet 5 mg, 5 mg at 07/11/23 2150 **OR** oxyCODONE (ROXICODONE) tablet 10 mg  Body mass index is 29.69 kg/m .  Wt Readings from Last 5 Encounters:   07/11/23 102.1 kg (225 lb)       Diet: Regular Diet Adult  DVT Prophylaxis:  Defer to primary service    Ángel Funez MD, MPH  Hospitalist,Hospital Medicine  [facility]  Phone: #[phone]    Medications:   Personally Reviewed.  Medications       acetaminophen  975 mg Oral Q8H     [START ON 7/13/2023] calcitRIOL  0.25 mcg Oral Once per day on Sun Thu Fri Sat     calcitRIOL  0.5 mcg Oral Once per day on Mon Tue Wed     calcium carbonate  1,000 mg Oral BID w/meals     chlorthalidone  25 mg Oral Daily     losartan  25 mg Oral Daily     metoprolol  "succinate ER  100 mg Oral Daily     polyethylene glycol  17 g Oral Daily     senna-docusate  1 tablet Oral BID     sodium bicarbonate  1,300 mg Oral BID     sodium chloride (PF)  3 mL Intracatheter Q8H     Vitamin D3  25 mcg Oral QPM       Clinically Significant Risk Factors Present on Admission           # Hypercalcemia: Highest Ca = 11.3 mg/dL in last 2 days, will monitor as appropriate        # Hypertension: Home medication list includes antihypertensive(s)      # Overweight: Estimated body mass index is 29.69 kg/m  as calculated from the following:    Height as of this encounter: 1.854 m (6' 1\").    Weight as of this encounter: 102.1 kg (225 lb).            "

## 2023-07-12 NOTE — PLAN OF CARE
Pt cleared for discharge per Ortho & [facility]. Removed PIV, assisted in gathering pt's belongings & reviewed AVS to pt's verbalized satisfaction & understanding. Hospital staff escorted pt to main entrance & pt discharged to home. Pt left via Uber vehicle w/o family/friends accompanying.     CAMILO Dove  Shift: 0700 - 1930

## 2023-07-12 NOTE — PROGRESS NOTES
Patient vital signs are at baseline: Yes  Patient able to ambulate as they were prior to admission or with assist devices provided by therapies during their stay:  Yes  Patient MUST void prior to discharge:  Yes  Patient able to tolerate oral intake:  Yes  Pain has adequate pain control using Oral analgesics:  Yes  Does patient have an identified :  Yes  Has goal D/C date and time been discussed with patient:  Yes   Pt A/o x4, calls appropriately, alarms on for safety.

## 2023-07-12 NOTE — PROGRESS NOTES
Physical Therapy Discharge Summary    Reason for therapy discharge:    All goals and outcomes met, no further needs identified.    Progress towards therapy goal(s). See goals on Care Plan in [facility] electronic health record for goal details.  Goals met    Therapy recommendation(s):    Continue home exercise program.

## 2023-07-12 NOTE — PROGRESS NOTES
"Orthopedic Progress Note      Assessment: 1 Day Post-Op  S/P Procedure(s):  LUMBAR 4-5 BILATERAL MEDIAL FACETECTOMY AND DECOMPRESSION     Plan:   - Hemoglobin 10.2 this morning (10.3 pre-operatively)   - hemovac drain pulled on night shift due to <30cc output on two consecutive nursing shifts.   - Continue PT/OT  - Weightbearing status: WBAT  - Anticoagulation: none in addition to SCDs, armaan stockings and early ambulation.  - Pain Management  - follow up with Dr. Bahena in 6 weeks  - Discharge planning: passed therapies, stable to discharge home today from orthopedic standpoint.      Subjective:  Pain: mild  Nausea, Vomiting:  No  Lightheadedness, Dizziness:  No  Neuro:  Patient denies new onset numbness or paresthesias  Fever, chills: No  Chest pain: No  SOB: No    Patient reports feeling well today. Patient tolerated with current pain regimen.   Patient eating and drinking well. Patient voiding and passing gas however no BM.  All questions/concerns answered.      Objective:  BP (!) 148/69 (BP Location: Left arm)   Pulse 66   Temp 98.2  F (36.8  C) (Oral)   Resp 18   Ht 1.854 m (6' 1\")   Wt 102.1 kg (225 lb)   SpO2 97%   BMI 29.69 kg/m    The patient is Alert and awake. Patient is up in bed and appears comfortable.   Sensation is intact.  Dorsiflexion and plantar flexion is intact.  Dorsalis pedis pulse intact.  Compartments are soft and non-tender.   The incision is covered. Dressing C/D/I.  LLE Motor: 5/5 quads, 5/5 hip flexors, 5/5 gastroc, 5/5 tib ant, 5/5 ehl  RLE Motor: 5/5 quads, 5/5 hip flexors, 5/5 gastroc, 5/5 tib ant, 5/5 ehl    Pertinent Labs   Lab Results: personally reviewed.   No results found for: INR, PROTIME  Lab Results   Component Value Date    HGB 10.2 (L) 07/12/2023     Lab Results   Component Value Date     07/12/2023    CO2 22 07/12/2023         Report completed by:  Bri Cosby PA-C / Dr. Heladio Bahena  Date: 7/12/2023  Time: 8:24 AM  [facility]        "

## 2023-07-12 NOTE — PLAN OF CARE
Patient vital signs are at baseline: Yes  Patient able to ambulate as they were prior to admission or with assist devices provided by therapies during their stay:  Yes, stand by assist   Patient MUST void prior to discharge:  Yes, completed two PVR, one more is needed   Patient able to tolerate oral intake:  Yes  Pain has adequate pain control using Oral analgesics:  Yes  Does patient have an identified :  Yes  Has goal D/C date and time been discussed with patient:  Yes     Goal Outcome Evaluation:      Plan of Care Reviewed With: patient    Overall Patient Progress: improvingOverall Patient Progress: improving    Alert and oriented x 4, able to make needs known, CMS intact, dsg CDI, HV output was 1 ml , pulled out drain per order, PIV saline locked, hypoactive bowel sounds, but reported passing flatus and denied distention or discomfort

## 2023-07-12 NOTE — PROGRESS NOTES
07/12/23 0830   Appointment Info   Signing Clinician's Name / Credentials (OT) Noemi Monterroso OTR/L   Quick Adds   Quick Adds Certification   Living Environment   People in Home alone   Current Living Arrangements apartment   Living Environment Comments WIS with GB RTS with hermann on the R side.   Self-Care   Usual Activity Tolerance excellent   Current Activity Tolerance good   Regular Exercise Yes   Activity/Exercise Type   (Pickleball 3-5x/wk)   Instrumental Activities of Daily Living (IADL)   Previous Responsibilities meal prep;housekeeping;laundry;shopping;medication management;finances;driving   General Information   Onset of Illness/Injury or Date of Surgery 07/11/23   Referring Physician Dr. Gabe Bahena   Patient/Family Therapy Goal Statement (OT) To return home.   Additional Occupational Profile Info/Pertinent History of Current Problem Adm for spine surgery = facetectomy decomp lumbar spine.  PMH:  HTN, renal artery stenosis, prostate CA, chronic kidney disease stage 5 - Peritoneal dialysis   Existing Precautions/Restrictions (S)  spinal   Cognitive Status Examination   Orientation Status orientation to person, place and time   Follows Commands WNL   Visual Perception   Visual Impairment/Limitations corrective lenses for distance   Bed Mobility   Bed Mobility rolling right;supine-sit;sit-supine   Rolling Right [place] (Bed Mobility) supervision;verbal cues   Supine-Sit [place] (Bed Mobility) supervision;verbal cues   Sit-Supine [place] (Bed Mobility) supervision;verbal cues   Transfers   Transfers bed-chair transfer;sit-stand transfer;toilet transfer   Transfer Comments No assistive walking device used.   Transfer Skill: Bed to Chair/Chair to Bed   Bed-Chair [place] (Transfers) supervision;verbal cues   Sit-Stand Transfer   Sit-Stand [place] (Transfers) supervision;verbal cues   Toilet Transfer   Type (Toilet Transfer) sit-stand;stand-sit   [place] Level  (Toilet Transfer) supervision;verbal cues   Assistive Device (Toilet Transfer) raised toilet seat;grab bars/safety frame   Balance   Balance Assessment standing balance: dynamic   Balance Comments good   Activities of Daily Living   BADL Assessment/Intervention upper body dressing;lower body dressing;grooming;toileting   Upper Body Dressing Assessment/Training   Position (Upper Body Dressing) unsupported standing  (encourage Pt to sit for safety when donning clothing over his head and feet.)   [place] Level (Upper Body Dressing) supervision;verbal cues   Lower Body Dressing Assessment/Training   [place] Level (Lower Body Dressing) supervision;verbal cues   Grooming Assessment/Training   Position (Grooming) unsupported standing   [place] Level (Grooming) supervision;verbal cues   Toileting   Position (Toileting) supported sitting;unsupported standing   Assistive Devices (Toileting) raised toilet seat;grab bar, toilet   [place] Level (Toileting) supervision;verbal cues   Clinical Impression   Criteria for Skilled Therapeutic Interventions Met (OT) Yes, treatment indicated   OT Diagnosis decreased indep with ADLs and trsf due to spinal surgery - L4-5 facetectomy decomp.   OT Problem List-Impairments impacting ADL problems related to;mobility   Assessment of Occupational Performance 3-5 Performance Deficits   Identified Performance Deficits dressing, toileting, bathing, G/H and trsfs   Planned Therapy Interventions (OT) ADL retraining   Clinical Decision Making Complexity (OT) moderate complexity   Risk & Benefits of therapy have been explained evaluation/treatment results reviewed;participants included;patient   OT Total Evaluation Time   OT Eval, Moderate Complexity Minutes (53250) 15   Therapy Certification   Medical Diagnosis Facetectomy decomp of L4-5   Start of Care Date 07/12/23   Certification date from 07/12/23   Certification date to 08/12/23   OT Goals   Therapy Frequency (OT) One time  eval and treatment   OT Predicted Duration/Target Date for Goal Attainment 07/12/23   OT Goals Upper Body Dressing;Lower Body Dressing;Bed Mobility;Transfers   OT: Upper Body Dressing Independent;Completed   OT: Lower Body Dressing Independent;Completed   OT: Bed Mobility Independent;Completed   OT: Transfer Independent;Completed   Interventions   Interventions Quick Adds Self-Care/Home Management   Self-Care/Home Management   Self-Care/Home Mgmt/ADL, Compensatory, Meal Prep Minutes (07987) 30   Symptoms Noted During/After Treatment (Meal Preparation/Planning Training) none   Treatment Detail/Skilled Intervention Pt sitting up in his chair when therapist arrived.  Reviewed and provided handout for spine precautions.  Worked on FB dressing.  VC needed initially for correct technique to stay within his spine precautions.  No AE needed.  Pt worked on room trsfs without an assistive walking device.  Completed trsfs to chair, bed - worked on log rolling, toilet, BR sink, WIS and chair.  Initially needed SBA for VC re: correct technique and hand placement.  By end of session, Pt was indep with all trsfs.  Therapist demo car trsf and reviewed kitchen mobility.  Pt aware of using vanity top or kitchen counter as his support when standing stationary.  At end of session, Pt was resting in bed with MD present.  Call light within reach and bed alarm on.   OT Discharge Planning   OT Plan D/C OT   OT Discharge Recommendation (DC Rec) home   OT Rationale for DC Rec Pt indep with his self cares and trsfs.  Agree with plan for discharge to home.   OT Brief overview of current status OT goals met.   Total Session Time   Timed Code Treatment Minutes 30   Total Session Time (sum of timed and untimed services) 45    [facility]  OUTPATIENT OCCUPATIONAL THERAPY  EVALUATION  PLAN OF TREATMENT FOR OUTPATIENT REHABILITATION  (COMPLETE FOR INITIAL CLAIMS ONLY)  Patient's Last Name, First Name, M.I.  Date of  Birth:  1950  Schoenberger,Donald F                          Provider's Name  [facility] Medical Record No.  [id number]                             Onset Date:  07/11/23   Start of Care Date:  07/12/23   Type:     ___PT   _X_OT   ___SLP Medical Diagnosis:  Facetectomy decomp of L4-5                    OT Diagnosis:  decreased indep with ADLs and trsf due to spinal surgery - L4-5 facetectomy decomp. Visits from SOC:  1     See note for plan of treatment, functional goals and certification details    I CERTIFY THE NEED FOR THESE SERVICES FURNISHED UNDER        THIS PLAN OF TREATMENT AND WHILE UNDER MY CARE     (Physician co-signature of this document indicates review and certification of the therapy plan).

## 2023-07-24 NOTE — PROGRESS NOTES
[facility]  OUTPATIENT PHYSICAL THERAPY EVALUATION  PLAN OF TREATMENT FOR OUTPATIENT REHABILITATION  (COMPLETE FOR INITIAL CLAIMS ONLY)  Patient's Last Name, First Name, M.I.  YOB: 1950  Schoenberger,Donald F                        Provider's Name  [facility] Medical Record No.  [id number]                             Onset Date:  07/11/23   Start of Care Date:  07/11/23   Type:     _X_PT   ___OT   ___SLP Medical Diagnosis:  spinal stenosis              PT Diagnosis:  impaired functional mobility Visits from SOC:  1     See note for plan of treatment, functional goals and certification details    I CERTIFY THE NEED FOR THESE SERVICES FURNISHED UNDER        THIS PLAN OF TREATMENT AND WHILE UNDER MY CARE     (Physician co-signature of this document indicates review and certification of the therapy plan).

## 2023-07-24 NOTE — PROGRESS NOTES
07/11/23 1603   Appointment Info   Signing Clinician's Name / Credentials (PT) Fabiola Badillo, LINCOLN   Quick Adds   Quick Adds Certification   Living Environment   People in Home alone   Current Living Arrangements apartment   Home Accessibility   (elevator)   Transportation Anticipated family or friend will provide;public transportation   Self-Care   Regular Exercise Yes   Activity/Exercise Type   (pickleball)   Exercise Amount/Frequency 3-5 times/wk   Equipment Currently Used at Home none   Fall history within last six months no   Activity/Exercise/Self-Care Comment totally indep.   General Information   Onset of Illness/Injury or Date of Surgery 07/11/23   Referring Physician dolly wilson   Patient/Family Therapy Goals Statement (PT) pickleball ASAP   Existing Precautions/Restrictions spinal  (surgical drain.   Educated spine prec.)   Cognition   Orientation Status (Cognition) oriented x 4   Pain Assessment   Patient Currently in Pain   (1)   Posture    Posture Comments increased kyphosis/forward head   Range of Motion (ROM)   ROM Comment WFL extremities   Strength (Manual Muscle Testing)   Strength Comments LE's WFL   Bed Mobility   Bed Mobility Limitations   (spine prec.)   Gait/Stairs (Locomotion)   [place] Level (Gait) supervision   Assistive Device (Gait)   (none)   Distance in Feet 20  (then some cues/treatment)   Distance in Feet (Gait) 350   Pattern (Gait) step-through   Deviations/Abnormal Patterns (Gait) base of support, wide   Sensory Examination   Sensory Perception patient reports no sensory changes   Clinical Impression   Criteria for Skilled Therapeutic Intervention Yes, treatment indicated   PT Diagnosis (PT) impaired functional mobility   Influenced by the following impairments spine precautions, incision pain   Functional limitations due to impairments bed mob, amb.   Clinical Presentation (PT Evaluation Complexity) Stable/Uncomplicated   Clinical Presentation Rationale  "presents as medically diagnosed   Clinical Decision Making (Complexity) low complexity   Planned Therapy Interventions (PT) bed mobility training;home exercise program;gait training   Risk & Benefits of therapy have been explained evaluation/treatment results reviewed;care plan/treatment goals reviewed;patient   PT Total Evaluation Time   PT Eval, Moderate Complexity Minutes (42362) 23   Therapy Certification   Start of care date 07/11/23   Certification date from 07/11/23   Certification date to 08/10/23   Medical Diagnosis spinal stenosis   Physical Therapy Goals   PT Frequency Daily   PT Predicted Duration/Target Date for Goal Attainment 07/12/23   PT Goals Bed Mobility;Gait;PT Goal 1   PT: Bed Mobility Independent;Within precautions   PT: Gait Independent;Greater than 200 feet   PT: Goal 1 indep. spine surgery home ex   Interventions   Interventions Quick Adds Therapeutic Procedure;Gait Training   Therapeutic Procedure/Exercise   Ther. Procedure: strength, endurance, ROM, flexibillity Minutes (67162) 5   Treatment Detail/Skilled Intervention supine spine surgery ex: AP, QS, GS each x10   Gait Training   Gait Training Minutes (61740) 10   Treatment Detail/Skilled Intervention cues for posture   [place] Level (Gait Training) stand-by assist  (feels a little \"wobbly\" compared to normal)   Physical Assistance Level (Gait Training) supervision;verbal cues   Weight Bearing (Gait Training) weight-bearing as tolerated   Pattern Analysis (Gait Training) swing-through gait   Gait Analysis Deviations increased stride width  (slightly incr. lateral trunk flexion)   Impairments (Gait Analysis/Training) pain  (mild, incision pain)   PT Discharge Planning   PT Plan standing spine surgery ex; repeat amb.   PT Discharge Recommendation (DC Rec) home   PT Rationale for DC Rec moving well; feels confident   PT Brief overview of current status amb. sba 350   Total Session Time   Timed Code Treatment Minutes 15   Total Session " Time (sum of timed and untimed services) 38   .[email or web address]

## 2023-10-21 ENCOUNTER — HEALTH MAINTENANCE LETTER (OUTPATIENT)
Age: 73
End: 2023-10-21

## 2024-12-08 ENCOUNTER — HEALTH MAINTENANCE LETTER (OUTPATIENT)
Age: 74
End: 2024-12-08

## (undated) DEVICE — SUCTION MANIFOLD NEPTUNE 2 SYS 1 PORT 702-025-000

## (undated) DEVICE — SUTURE VICRYL+ 2-0 CT-2 27" UND VCP269H

## (undated) DEVICE — DECANTER VIAL 2006S

## (undated) DEVICE — CATH IV 14GA 2IN REM FLASHPLUG DEHP-FR PVC FR 4251717-02

## (undated) DEVICE — POSITIONER ARM CRADLE LAMINECTOMY DISP

## (undated) DEVICE — SOL NACL 0.9% IRRIG 1000ML BOTTLE 2F7124

## (undated) DEVICE — PACK COLD 6 X 10 1-HOUR 0814-0610

## (undated) DEVICE — WRAP LUMBAR COMPRESS COLD THERAPY 4632P

## (undated) DEVICE — DRSG GAUZE 4X4" TRAY 6939

## (undated) DEVICE — SUTURE VICRYL+ 3-0 18IN PS-2 UND VCP497H

## (undated) DEVICE — SOL WATER IRRIG 1000ML BOTTLE 2F7114

## (undated) DEVICE — PLATE GROUNDING ADULT W/CORD 9165L

## (undated) DEVICE — GLOVE UNDER INDICATOR PI SZ 7.0 LF 41670

## (undated) DEVICE — CUSHION INSERT LG PRONE VIEW JACKSON TABLE

## (undated) DEVICE — Device

## (undated) DEVICE — DRSG GAUZE 4X4" 3033

## (undated) DEVICE — KIT DRAIN CLOSED WOUND SUCTION MED 400ML RESVR

## (undated) DEVICE — DRSG STERI STRIP 1/2X4" R1547

## (undated) DEVICE — GLOVE BIOGEL PI INDICATOR 8.0 LF 41680

## (undated) DEVICE — RX SURGIFLO HEMOSTATIC MATRIX 8ML 2991

## (undated) DEVICE — ESU PENCIL SMOKE EVAC W/ROCKER SWITCH 0703-047-000

## (undated) DEVICE — GLOVE SURG PI ULTRA TOUCH M SZ 7 LF 42670

## (undated) DEVICE — CUSTOM PACK LUMBAR FUSION SNE5BLFHEA

## (undated) DEVICE — DRAPE TOWEL IMPERVIOUS X2 7553

## (undated) DEVICE — TOOL DISSECT MIDAS MR8 14CM MATCH HEAD 3MM MR8-14MH30

## (undated) DEVICE — SPONGE NEURO 1 X 1 WECK 200101

## (undated) DEVICE — SUTURE VICRYL+ 0 27IN CT-1 UND VCP260H

## (undated) DEVICE — GLOVE SURG PI ULTRA TOUCH M SZ 8 LF

## (undated) DEVICE — DRAPE C-ARM 60X42" 1013

## (undated) RX ORDER — PHENYLEPHRINE HYDROCHLORIDE 10 MG/ML
INJECTION INTRAVENOUS
Status: DISPENSED
Start: 2023-07-11

## (undated) RX ORDER — PROPOFOL 10 MG/ML
INJECTION, EMULSION INTRAVENOUS
Status: DISPENSED
Start: 2023-07-11

## (undated) RX ORDER — DEXAMETHASONE SODIUM PHOSPHATE 10 MG/ML
INJECTION, EMULSION INTRAMUSCULAR; INTRAVENOUS
Status: DISPENSED
Start: 2023-07-11

## (undated) RX ORDER — EPHEDRINE SULFATE 50 MG/ML
INJECTION, SOLUTION INTRAMUSCULAR; INTRAVENOUS; SUBCUTANEOUS
Status: DISPENSED
Start: 2023-07-11

## (undated) RX ORDER — ONDANSETRON 2 MG/ML
INJECTION INTRAMUSCULAR; INTRAVENOUS
Status: DISPENSED
Start: 2023-07-11

## (undated) RX ORDER — CEFAZOLIN SODIUM 1 G/3ML
INJECTION, POWDER, FOR SOLUTION INTRAMUSCULAR; INTRAVENOUS
Status: DISPENSED
Start: 2023-07-11

## (undated) RX ORDER — FENTANYL CITRATE 50 UG/ML
INJECTION, SOLUTION INTRAMUSCULAR; INTRAVENOUS
Status: DISPENSED
Start: 2023-07-11

## (undated) RX ORDER — DEXMEDETOMIDINE HYDROCHLORIDE 4 UG/ML
INJECTION, SOLUTION INTRAVENOUS
Status: DISPENSED
Start: 2023-07-11

## (undated) RX ORDER — LIDOCAINE HYDROCHLORIDE 10 MG/ML
INJECTION, SOLUTION EPIDURAL; INFILTRATION; INTRACAUDAL; PERINEURAL
Status: DISPENSED
Start: 2023-07-11